# Patient Record
Sex: FEMALE | Race: WHITE | NOT HISPANIC OR LATINO | Employment: FULL TIME | URBAN - METROPOLITAN AREA
[De-identification: names, ages, dates, MRNs, and addresses within clinical notes are randomized per-mention and may not be internally consistent; named-entity substitution may affect disease eponyms.]

---

## 2018-01-31 ENCOUNTER — OFFICE VISIT (OUTPATIENT)
Dept: URGENT CARE | Facility: CLINIC | Age: 33
End: 2018-01-31
Payer: COMMERCIAL

## 2018-01-31 VITALS
BODY MASS INDEX: 25.76 KG/M2 | DIASTOLIC BLOOD PRESSURE: 72 MMHG | SYSTOLIC BLOOD PRESSURE: 116 MMHG | HEART RATE: 74 BPM | HEIGHT: 68 IN | TEMPERATURE: 98 F | RESPIRATION RATE: 16 BRPM | WEIGHT: 170 LBS | OXYGEN SATURATION: 98 %

## 2018-01-31 DIAGNOSIS — J02.0 STREP THROAT: Primary | ICD-10-CM

## 2018-01-31 LAB
INT CON NEG: NEGATIVE
INT CON POS: POSITIVE
S PYO AG THROAT QL: POSITIVE

## 2018-01-31 PROCEDURE — 87880 STREP A ASSAY W/OPTIC: CPT | Performed by: PHYSICIAN ASSISTANT

## 2018-01-31 PROCEDURE — 99204 OFFICE O/P NEW MOD 45 MIN: CPT | Performed by: PHYSICIAN ASSISTANT

## 2018-01-31 RX ORDER — IBUPROFEN 200 MG
200 TABLET ORAL EVERY 6 HOURS PRN
COMMUNITY

## 2018-01-31 RX ORDER — NORETHINDRONE ACETATE AND ETHINYL ESTRADIOL 1; 20 MG/1; UG/1
TABLET ORAL
COMMUNITY
Start: 2018-01-12

## 2018-01-31 RX ORDER — ESCITALOPRAM OXALATE 5 MG/1
TABLET ORAL
COMMUNITY
Start: 2018-01-25

## 2018-01-31 RX ORDER — AMOXICILLIN AND CLAVULANATE POTASSIUM 875; 125 MG/1; MG/1
1 TABLET, FILM COATED ORAL 2 TIMES DAILY
Qty: 20 TAB | Refills: 0 | Status: SHIPPED | OUTPATIENT
Start: 2018-01-31 | End: 2018-02-10

## 2018-01-31 NOTE — PATIENT INSTRUCTIONS
"Strep Throat  Strep throat is an infection of the throat caused by a bacteria named Streptococcus pyogenes. Your health care provider may call the infection streptococcal \"tonsillitis\" or \"pharyngitis\" depending on whether there are signs of inflammation in the tonsils or back of the throat. Strep throat is most common in children aged 5-15 years during the cold months of the year, but it can occur in people of any age during any season. This infection is spread from person to person (contagious) through coughing, sneezing, or other close contact.  SIGNS AND SYMPTOMS   · Fever or chills.  · Painful, swollen, red tonsils or throat.  · Pain or difficulty when swallowing.  · White or yellow spots on the tonsils or throat.  · Swollen, tender lymph nodes or \"glands\" of the neck or under the jaw.  · Red rash all over the body (rare).  DIAGNOSIS   Many different infections can cause the same symptoms. A test must be done to confirm the diagnosis so the right treatment can be given. A \"rapid strep test\" can help your health care provider make the diagnosis in a few minutes. If this test is not available, a light swab of the infected area can be used for a throat culture test. If a throat culture test is done, results are usually available in a day or two.  TREATMENT   Strep throat is treated with antibiotic medicine.  HOME CARE INSTRUCTIONS   · Gargle with 1 tsp of salt in 1 cup of warm water, 3-4 times per day or as needed for comfort.  · Family members who also have a sore throat or fever should be tested for strep throat and treated with antibiotics if they have the strep infection.  · Make sure everyone in your household washes their hands well.  · Do not share food, drinking cups, or personal items that could cause the infection to spread to others.  · You may need to eat a soft food diet until your sore throat gets better.  · Drink enough water and fluids to keep your urine clear or pale yellow. This will help prevent " dehydration.  · Get plenty of rest.  · Stay home from school, day care, or work until you have been on antibiotics for 24 hours.  · Take medicines only as directed by your health care provider.  · Take your antibiotic medicine as directed by your health care provider. Finish it even if you start to feel better.  SEEK MEDICAL CARE IF:   · The glands in your neck continue to enlarge.  · You develop a rash, cough, or earache.  · You cough up green, yellow-brown, or bloody sputum.  · You have pain or discomfort not controlled by medicines.  · Your problems seem to be getting worse rather than better.  · You have a fever.  SEEK IMMEDIATE MEDICAL CARE IF:   · You develop any new symptoms such as vomiting, severe headache, stiff or painful neck, chest pain, shortness of breath, or trouble swallowing.  · You develop severe throat pain, drooling, or changes in your voice.  · You develop swelling of the neck, or the skin on the neck becomes red and tender.  · You develop signs of dehydration, such as fatigue, dry mouth, and decreased urination.  · You become increasingly sleepy, or you cannot wake up completely.  MAKE SURE YOU:  · Understand these instructions.  · Will watch your condition.  · Will get help right away if you are not doing well or get worse.     This information is not intended to replace advice given to you by your health care provider. Make sure you discuss any questions you have with your health care provider.     Document Released: 12/15/2001 Document Revised: 01/08/2016 Document Reviewed: 04/11/2016  Stamped Interactive Patient Education ©2016 [employer]

## 2018-01-31 NOTE — PROGRESS NOTES
Subjective:      Pt is a 33 y.o. female who presents with Pharyngitis (X  yesterday )            HPI  PT presents to  clinic today complaining of sore throat. PT denies CP, SOB, NVD, abdominal pain, joint pain. PT states these symptoms began around 1 day ago and that the pt is visiting from [place]. Pt has not taken any RX medications for this condition. PT states the pain is a 5/10 with swallowing, aching in nature and worse at night. The pt's medication list, problem list, and allergies have been evaluated and reviewed during today's visit.      PMH:  Negative per pt.      PSH:  Negative per pt.      Fam Hx:  the patient's family history is not pertinent to their current complaint    Soc HX:  Social History     Social History   • Marital status:      Spouse name: N/A   • Number of children: N/A   • Years of education: N/A     Occupational History   • Not on file.     Social History Main Topics   • Smoking status: Never Smoker   • Smokeless tobacco: Never Used   • Alcohol use Yes      Comment: light   • Drug use: No   • Sexual activity: Not on file     Other Topics Concern   • Not on file     Social History Narrative   • No narrative on file         Medications:    Current Outpatient Prescriptions:   •  escitalopram (LEXAPRO) 5 MG tablet, , Disp: , Rfl:   •  JUNEL 1/20 1-20 MG-MCG per tablet, , Disp: , Rfl:   •  ibuprofen (MOTRIN) 200 MG Tab, Take 200 mg by mouth every 6 hours as needed., Disp: , Rfl:   •  amoxicillin-clavulanate (AUGMENTIN) 875-125 MG Tab, Take 1 Tab by mouth 2 times a day for 10 days., Disp: 20 Tab, Rfl: 0      Allergies:  Patient has no known allergies.    ROS  Constitutional: Positive for malaise/fatigue.   HENT: Positive for congestion and sore throat. Negative for ear pain.    Eyes: Negative for blurred vision, double vision and photophobia.   Respiratory:  Negative for hemoptysis, shortness of breath and wheezing.    Cardiovascular: Negative for chest pain and palpitations.  "  Gastrointestinal: Negative for nausea, vomiting, abdominal pain, diarrhea and constipation.   Genitourinary: Negative for dysuria and flank pain.   Musculoskeletal: Negative for falls and myalgias.   Skin: Negative for itching and rash.   Neurological:  Negative for dizziness and tingling.   Endo/Heme/Allergies: Does not bruise/bleed easily.   Psychiatric/Behavioral: Negative for depression. The patient is not nervous/anxious.             Objective:     /72   Pulse 74   Temp 36.7 °C (98 °F)   Resp 16   Ht 1.715 m (5' 7.5\")   Wt 77.1 kg (170 lb)   SpO2 98%   BMI 26.23 kg/m²      Physical Exam       Constitutional: PT is oriented to person, place, and time. PT appears well-developed and well-nourished. No distress.   HENT:   Head: Normocephalic and atraumatic.   Right Ear: Hearing, tympanic membrane, external ear and ear canal normal.   Left Ear: Hearing, tympanic membrane, external ear and ear canal normal.   Nose: Mucosal edema, rhinorrhea and sinus tenderness present. Right sinus exhibits frontal sinus tenderness. Left sinus exhibits frontal sinus tenderness.   Mouth/Throat: Uvula is midline. Mucous membranes are pale. Posterior oropharyngeal edema and posterior oropharyngeal erythema present. No oropharyngeal exudate.   Eyes: Conjunctivae normal and EOM are normal. Pupils are equal, round, and reactive to light.   Neck: Normal range of motion. Neck supple. No thyromegaly present.   Cardiovascular: Normal rate, regular rhythm, normal heart sounds and intact distal pulses.  Exam reveals no gallop and no friction rub.    No murmur heard.  Pulmonary/Chest: Effort normal and breath sounds normal. No respiratory distress. PT has no wheezes. PT has no rales. PT exhibits no tenderness.   Abdominal: Soft. Bowel sounds are normal. PT exhibits no distension and no mass. There is no tenderness. There is no rebound and no guarding.   Musculoskeletal: Normal range of motion. PT exhibits no edema and no " tenderness.   Lymphadenopathy:     PT has no cervical adenopathy.   Neurological: PT is alert and oriented to person, place, and time. PT displays normal reflexes. No cranial nerve deficit. PT exhibits normal muscle tone. Coordination normal.   Skin: Skin is warm and dry. No rash noted. No erythema.   Psychiatric: PT has a normal mood and affect. PT behavior is normal. Judgment and thought content normal.        Assessment/Plan:     1. Strep throat    - POCT Rapid Strep A-->POS  - amoxicillin-clavulanate (AUGMENTIN) 875-125 MG Tab; Take 1 Tab by mouth 2 times a day for 10 days.  Dispense: 20 Tab; Refill: 0    Rest, fluids encouraged.  OTC decongestant for congestion  AVS with medical info given.  Pt was in full understanding and agreement with the plan.  Follow-up as needed if symptoms worsen or fail to improve.